# Patient Record
Sex: MALE | Race: WHITE | NOT HISPANIC OR LATINO | Employment: OTHER | URBAN - METROPOLITAN AREA
[De-identification: names, ages, dates, MRNs, and addresses within clinical notes are randomized per-mention and may not be internally consistent; named-entity substitution may affect disease eponyms.]

---

## 2023-10-20 ENCOUNTER — TELEPHONE (OUTPATIENT)
Dept: PEDIATRIC NEUROLOGY | Facility: HOSPITAL | Age: 28
End: 2023-10-20
Payer: MEDICARE

## 2023-10-20 DIAGNOSIS — F90.2 ATTENTION DEFICIT HYPERACTIVITY DISORDER (ADHD), COMBINED TYPE: ICD-10-CM

## 2023-10-20 RX ORDER — DEXTROAMPHETAMINE SACCHARATE, AMPHETAMINE ASPARTATE MONOHYDRATE, DEXTROAMPHETAMINE SULFATE AND AMPHETAMINE SULFATE 7.5; 7.5; 7.5; 7.5 MG/1; MG/1; MG/1; MG/1
30 CAPSULE, EXTENDED RELEASE ORAL DAILY
COMMUNITY
End: 2023-10-20 | Stop reason: SDUPTHER

## 2023-10-20 RX ORDER — DEXTROAMPHETAMINE SACCHARATE, AMPHETAMINE ASPARTATE MONOHYDRATE, DEXTROAMPHETAMINE SULFATE AND AMPHETAMINE SULFATE 7.5; 7.5; 7.5; 7.5 MG/1; MG/1; MG/1; MG/1
30 CAPSULE, EXTENDED RELEASE ORAL DAILY
Qty: 30 CAPSULE | Refills: 0 | Status: SHIPPED | OUTPATIENT
Start: 2023-11-20 | End: 2023-12-19 | Stop reason: SDUPTHER

## 2023-10-20 RX ORDER — DEXTROAMPHETAMINE SACCHARATE, AMPHETAMINE ASPARTATE MONOHYDRATE, DEXTROAMPHETAMINE SULFATE AND AMPHETAMINE SULFATE 7.5; 7.5; 7.5; 7.5 MG/1; MG/1; MG/1; MG/1
30 CAPSULE, EXTENDED RELEASE ORAL DAILY
Qty: 30 CAPSULE | Refills: 0 | Status: SHIPPED | OUTPATIENT
Start: 2023-12-21 | End: 2023-12-19 | Stop reason: SDUPTHER

## 2023-10-20 RX ORDER — DEXTROAMPHETAMINE SACCHARATE, AMPHETAMINE ASPARTATE MONOHYDRATE, DEXTROAMPHETAMINE SULFATE AND AMPHETAMINE SULFATE 7.5; 7.5; 7.5; 7.5 MG/1; MG/1; MG/1; MG/1
30 CAPSULE, EXTENDED RELEASE ORAL DAILY
Qty: 30 CAPSULE | Refills: 0 | Status: SHIPPED | OUTPATIENT
Start: 2023-10-20 | End: 2023-12-19 | Stop reason: SDUPTHER

## 2023-10-20 NOTE — TELEPHONE ENCOUNTER
Rx Refill Request Telephone Encounter    Name:  Garcia Maxwell  :  [id number]  Medication Name:  Amphetamine- Dextroamphet ER 30MG oral capsule; take 1 capsule daily for ADHD  Quantity: 30 days  Specific Pharmacy location:    [id number] [address]  AT [facility] [address]     Date of last appointment:  10//21/22  Date of next appointment:  23  Best number to reach patient:  [phone]

## 2023-12-19 ENCOUNTER — OFFICE VISIT (OUTPATIENT)
Dept: PEDIATRIC NEUROLOGY | Facility: CLINIC | Age: 28
End: 2023-12-19
Payer: MEDICARE

## 2023-12-19 VITALS
WEIGHT: 188.4 LBS | DIASTOLIC BLOOD PRESSURE: 83 MMHG | BODY MASS INDEX: 27.91 KG/M2 | HEIGHT: 69 IN | RESPIRATION RATE: 20 BRPM | SYSTOLIC BLOOD PRESSURE: 147 MMHG | HEART RATE: 73 BPM

## 2023-12-19 DIAGNOSIS — F90.2 ATTENTION DEFICIT HYPERACTIVITY DISORDER (ADHD), COMBINED TYPE: ICD-10-CM

## 2023-12-19 DIAGNOSIS — F90.9 ATTENTION DEFICIT HYPERACTIVITY DISORDER (ADHD), UNSPECIFIED ADHD TYPE: Primary | ICD-10-CM

## 2023-12-19 PROBLEM — F79 INTELLECTUAL DISABILITY: Status: ACTIVE | Noted: 2023-12-19

## 2023-12-19 PROBLEM — L40.9 PSORIASIS: Status: ACTIVE | Noted: 2023-12-19

## 2023-12-19 PROCEDURE — 99213 OFFICE O/P EST LOW 20 MIN: CPT | Performed by: PSYCHIATRY & NEUROLOGY

## 2023-12-19 PROCEDURE — 1036F TOBACCO NON-USER: CPT | Performed by: PSYCHIATRY & NEUROLOGY

## 2023-12-19 RX ORDER — MONTELUKAST SODIUM 10 MG/1
10 TABLET ORAL NIGHTLY
COMMUNITY

## 2023-12-19 RX ORDER — DEXTROAMPHETAMINE SACCHARATE, AMPHETAMINE ASPARTATE MONOHYDRATE, DEXTROAMPHETAMINE SULFATE AND AMPHETAMINE SULFATE 7.5; 7.5; 7.5; 7.5 MG/1; MG/1; MG/1; MG/1
30 CAPSULE, EXTENDED RELEASE ORAL DAILY
Qty: 30 CAPSULE | Refills: 0 | Status: SHIPPED | OUTPATIENT
Start: 2024-01-19 | End: 2024-05-17 | Stop reason: SDUPTHER

## 2023-12-19 RX ORDER — APREMILAST 30 MG/1
TABLET, FILM COATED ORAL
COMMUNITY
Start: 2023-11-27

## 2023-12-19 RX ORDER — CETIRIZINE HYDROCHLORIDE 5 MG/1
TABLET ORAL
COMMUNITY

## 2023-12-19 RX ORDER — DEXTROAMPHETAMINE SACCHARATE, AMPHETAMINE ASPARTATE MONOHYDRATE, DEXTROAMPHETAMINE SULFATE AND AMPHETAMINE SULFATE 7.5; 7.5; 7.5; 7.5 MG/1; MG/1; MG/1; MG/1
30 CAPSULE, EXTENDED RELEASE ORAL DAILY
Qty: 30 CAPSULE | Refills: 0 | Status: SHIPPED | OUTPATIENT
Start: 2024-02-18 | End: 2024-02-21 | Stop reason: SDUPTHER

## 2023-12-19 RX ORDER — DEXTROAMPHETAMINE SACCHARATE, AMPHETAMINE ASPARTATE MONOHYDRATE, DEXTROAMPHETAMINE SULFATE AND AMPHETAMINE SULFATE 7.5; 7.5; 7.5; 7.5 MG/1; MG/1; MG/1; MG/1
30 CAPSULE, EXTENDED RELEASE ORAL DAILY
Qty: 30 CAPSULE | Refills: 0 | Status: SHIPPED | OUTPATIENT
Start: 2024-03-17 | End: 2024-04-16

## 2023-12-19 NOTE — LETTER
December 19, 2023     Danni Serna MD  [address] [facility] [id number]    Patient: Garcia Maxwell   YOB: 1995   Date of Visit: 12/19/2023       Dear Dr. Danni Serna MD:    Thank you for referring Garcia Maxwell to me for evaluation. Below are my notes for this consultation.  If you have questions, please do not hesitate to call me. I look forward to following your patient along with you.       Sincerely,     Oni Corey MD      CC: No Recipients  ______________________________________________________________________________________    Subjective  Garcia Maxwell is a 28 y.o.   male.  JASBIR Zelaya is a 28 year old man with intellectual disability and ADHD who is doing well on Adderall XR 30 mg daily with the effect lasting through the day. Without the medication, he has more difficulty focusing and is more talkative and fixates on an issue (perseverative). He is in a work program at [employer] 5 days weekly from 8:30 AM to 2:30 PM. He goes to group activities such as swimming He works at the [employer] ([employer]) and attends a social group for a variety of leisure activities. He is eating and sleeping well.      Review of Systems  All other systems have been reviewed with no other pertinent positive except medication for psoriasis and cetirizine for allergies,    Objective  Neurological Exam  Mental Status  Awake and alert.  Somewhat formal demeanor  Answers questions.    Motor   No abnormal involuntary movements.    Physical Exam  Constitutional:       General: He is awake.   Pulmonary:      Effort: Pulmonary effort is normal.   Neurological:      Mental Status: He is alert.   Psychiatric:         Mood and Affect: Mood normal.       Assessment/Plan    Garcia is doing well. His Adderall XR dose is adequate for attention.     1. Continue Adderall XR. Prescriptions were sent.  2. Follow up in one year.

## 2023-12-19 NOTE — PATIENT INSTRUCTIONS
Garcia is doing well. His Adderall XR dose is adequate for attention.     1. Continue Adderall XR. Prescriptions were sent.  2. Follow up in one year.

## 2024-02-21 DIAGNOSIS — F90.2 ATTENTION DEFICIT HYPERACTIVITY DISORDER (ADHD), COMBINED TYPE: ICD-10-CM

## 2024-02-21 RX ORDER — DEXTROAMPHETAMINE SACCHARATE, AMPHETAMINE ASPARTATE MONOHYDRATE, DEXTROAMPHETAMINE SULFATE AND AMPHETAMINE SULFATE 7.5; 7.5; 7.5; 7.5 MG/1; MG/1; MG/1; MG/1
30 CAPSULE, EXTENDED RELEASE ORAL DAILY
Qty: 30 CAPSULE | Refills: 0 | Status: SHIPPED | OUTPATIENT
Start: 2024-02-21 | End: 2024-03-22

## 2024-05-17 DIAGNOSIS — F90.2 ATTENTION DEFICIT HYPERACTIVITY DISORDER (ADHD), COMBINED TYPE: ICD-10-CM

## 2024-05-17 RX ORDER — DEXTROAMPHETAMINE SACCHARATE, AMPHETAMINE ASPARTATE MONOHYDRATE, DEXTROAMPHETAMINE SULFATE AND AMPHETAMINE SULFATE 7.5; 7.5; 7.5; 7.5 MG/1; MG/1; MG/1; MG/1
30 CAPSULE, EXTENDED RELEASE ORAL DAILY
Qty: 30 CAPSULE | Refills: 0 | Status: SHIPPED | OUTPATIENT
Start: 2024-05-17 | End: 2024-06-16

## 2024-05-17 RX ORDER — DEXTROAMPHETAMINE SACCHARATE, AMPHETAMINE ASPARTATE MONOHYDRATE, DEXTROAMPHETAMINE SULFATE AND AMPHETAMINE SULFATE 7.5; 7.5; 7.5; 7.5 MG/1; MG/1; MG/1; MG/1
30 CAPSULE, EXTENDED RELEASE ORAL DAILY
Qty: 30 CAPSULE | Refills: 0 | Status: SHIPPED | OUTPATIENT
Start: 2024-06-16 | End: 2024-07-16

## 2024-05-17 RX ORDER — DEXTROAMPHETAMINE SACCHARATE, AMPHETAMINE ASPARTATE MONOHYDRATE, DEXTROAMPHETAMINE SULFATE AND AMPHETAMINE SULFATE 7.5; 7.5; 7.5; 7.5 MG/1; MG/1; MG/1; MG/1
30 CAPSULE, EXTENDED RELEASE ORAL DAILY
Qty: 30 CAPSULE | Refills: 0 | Status: SHIPPED | OUTPATIENT
Start: 2024-07-16 | End: 2024-08-15

## 2024-08-22 DIAGNOSIS — F90.2 ATTENTION DEFICIT HYPERACTIVITY DISORDER (ADHD), COMBINED TYPE: ICD-10-CM

## 2024-08-22 RX ORDER — DEXTROAMPHETAMINE SACCHARATE, AMPHETAMINE ASPARTATE MONOHYDRATE, DEXTROAMPHETAMINE SULFATE AND AMPHETAMINE SULFATE 7.5; 7.5; 7.5; 7.5 MG/1; MG/1; MG/1; MG/1
30 CAPSULE, EXTENDED RELEASE ORAL DAILY
Qty: 30 CAPSULE | Refills: 0 | Status: SHIPPED | OUTPATIENT
Start: 2024-10-21 | End: 2024-11-20

## 2024-08-22 RX ORDER — DEXTROAMPHETAMINE SACCHARATE, AMPHETAMINE ASPARTATE MONOHYDRATE, DEXTROAMPHETAMINE SULFATE AND AMPHETAMINE SULFATE 7.5; 7.5; 7.5; 7.5 MG/1; MG/1; MG/1; MG/1
30 CAPSULE, EXTENDED RELEASE ORAL DAILY
Qty: 30 CAPSULE | Refills: 0 | Status: SHIPPED | OUTPATIENT
Start: 2024-09-21 | End: 2024-10-21

## 2024-08-22 RX ORDER — DEXTROAMPHETAMINE SACCHARATE, AMPHETAMINE ASPARTATE MONOHYDRATE, DEXTROAMPHETAMINE SULFATE AND AMPHETAMINE SULFATE 7.5; 7.5; 7.5; 7.5 MG/1; MG/1; MG/1; MG/1
30 CAPSULE, EXTENDED RELEASE ORAL DAILY
Qty: 30 CAPSULE | Refills: 0 | Status: SHIPPED | OUTPATIENT
Start: 2024-08-22 | End: 2024-09-21

## 2024-11-20 DIAGNOSIS — F90.2 ATTENTION DEFICIT HYPERACTIVITY DISORDER (ADHD), COMBINED TYPE: ICD-10-CM

## 2024-11-20 RX ORDER — DEXTROAMPHETAMINE SACCHARATE, AMPHETAMINE ASPARTATE MONOHYDRATE, DEXTROAMPHETAMINE SULFATE AND AMPHETAMINE SULFATE 7.5; 7.5; 7.5; 7.5 MG/1; MG/1; MG/1; MG/1
30 CAPSULE, EXTENDED RELEASE ORAL DAILY
Qty: 90 CAPSULE | Refills: 0 | Status: SHIPPED | OUTPATIENT
Start: 2024-11-20 | End: 2025-02-18

## 2025-01-14 ENCOUNTER — APPOINTMENT (OUTPATIENT)
Dept: PEDIATRIC NEUROLOGY | Facility: CLINIC | Age: 30
End: 2025-01-14
Payer: MEDICARE

## 2025-01-29 ENCOUNTER — APPOINTMENT (OUTPATIENT)
Dept: PEDIATRIC NEUROLOGY | Facility: CLINIC | Age: 30
End: 2025-01-29
Payer: MEDICARE

## 2025-01-29 VITALS — BODY MASS INDEX: 28.43 KG/M2 | WEIGHT: 187.61 LBS | HEIGHT: 68 IN

## 2025-01-29 DIAGNOSIS — F90.9 ATTENTION DEFICIT HYPERACTIVITY DISORDER (ADHD), UNSPECIFIED ADHD TYPE: Primary | ICD-10-CM

## 2025-01-29 PROCEDURE — 3008F BODY MASS INDEX DOCD: CPT | Performed by: PSYCHIATRY & NEUROLOGY

## 2025-01-29 PROCEDURE — 99213 OFFICE O/P EST LOW 20 MIN: CPT | Performed by: PSYCHIATRY & NEUROLOGY

## 2025-01-29 PROCEDURE — 1036F TOBACCO NON-USER: CPT | Performed by: PSYCHIATRY & NEUROLOGY

## 2025-01-29 NOTE — PATIENT INSTRUCTIONS
Brennon has an ADHD diagnosis.  On no medication today, he appears focused and calm.  Therefore, it is worthwhile to stop Adderall XR and see how he does.    Don't give medication for work or the weekend at this time.  Note the impact on his functioning (organization, efficiency, focus, distractibility).  Call next week about his status.  If functioning OK, can stop Adderall XR.  If worse with no medication, will restart.  Follow up will depend on whether he needs medication.

## 2025-01-29 NOTE — LETTER
February 2, 2025     Danni Serna MD  [address] [facility] [id number]    Patient: Garcia Maxwell   YOB: 1995   Date of Visit: 1/29/2025       Dear Dr. Danni Serna MD:    Thank you for referring Garcia Maxwell to me for evaluation. Below are my notes for this consultation.  If you have questions, please do not hesitate to call me. I look forward to following your patient along with you.       Sincerely,     Oni Corey MD      CC: No Recipients  ______________________________________________________________________________________    Subjective   Garcia Maxwell is a 29 y.o.  man with ADHD.  JASBIR Zelaya is a 29 year old man with intellectual disability and ADHD who is doing well on Adderall XR 30 mg daily with the effect lasting through the day. Without the medication, he had more difficulty focusing and was more talkative and fixates on an issue (perseverative).  He did not take Adderall XR today and is sitting calmly.  He hyperfocuses on the Ring doorbell and similar topics and likes to talk a lot about them.      He is in a work program at [employer] 5 days weekly from 8:30 AM to 2:30 PM. He goes to group activities such as swimming He works at the [employer] ([employer]) and attends a social group for a variety of leisure activities. He is eating and sleeping well.      Review of Systems  All other systems have been reviewed with no other pertinent positive except medication for psoriasis and cetirizine and montelukast for allergies,    Objective   Neurological Exam  Mental Status  Awake and alert.  Answers questions with short answers.    Physical Exam  Constitutional:       General: He is awake.   Neurological:      Mental Status: He is alert.     Assessment/Plan     Brennon has an ADHD diagnosis.  On no medication today, he appears focused and calm.  Therefore, it is worthwhile to stop Adderall XR and see how he does.    Don't give medication for work or  the weekend at this time.  Note the impact on his functioning (organization, efficiency, focus, distractibility).  Call next week about his status.  If functioning OK, can stop Adderall XR.  If worse with no medication, will restart.  Follow up will depend on whether he needs medication.

## 2025-01-29 NOTE — PROGRESS NOTES
Subjective   Garcia Maxwell is a 29 y.o.  man with ADHD.  JASBIR Zelaya is a 29 year old man with intellectual disability and ADHD who is doing well on Adderall XR 30 mg daily with the effect lasting through the day. Without the medication, he had more difficulty focusing and was more talkative and fixates on an issue (perseverative).  He did not take Adderall XR today and is sitting calmly.  He hyperfocuses on the Ring doorbell and similar topics and likes to talk a lot about them.      He is in a work program at [employer] 5 days weekly from 8:30 AM to 2:30 PM. He goes to group activities such as swimming He works at the [employer]) and attends a social group for a variety of leisure activities. He is eating and sleeping well.      Review of Systems  All other systems have been reviewed with no other pertinent positive except medication for psoriasis and cetirizine and montelukast for allergies,    Objective   Neurological Exam  Mental Status  Awake and alert.  Answers questions with short answers.    Physical Exam  Constitutional:       General: He is awake.   Neurological:      Mental Status: He is alert.     Assessment/Plan     Brennon has an ADHD diagnosis.  On no medication today, he appears focused and calm.  Therefore, it is worthwhile to stop Adderall XR and see how he does.    Don't give medication for work or the weekend at this time.  Note the impact on his functioning (organization, efficiency, focus, distractibility).  Call next week about his status.  If functioning OK, can stop Adderall XR.  If worse with no medication, will restart.  Follow up will depend on whether he needs medication.